# Patient Record
Sex: FEMALE | Race: WHITE | URBAN - METROPOLITAN AREA
[De-identification: names, ages, dates, MRNs, and addresses within clinical notes are randomized per-mention and may not be internally consistent; named-entity substitution may affect disease eponyms.]

---

## 2017-03-28 ENCOUNTER — OUTPATIENT (OUTPATIENT)
Dept: OUTPATIENT SERVICES | Facility: HOSPITAL | Age: 57
LOS: 1 days | Discharge: HOME | End: 2017-03-28

## 2017-03-28 DIAGNOSIS — Z98.89 OTHER SPECIFIED POSTPROCEDURAL STATES: Chronic | ICD-10-CM

## 2017-04-03 DIAGNOSIS — F32.9 MAJOR DEPRESSIVE DISORDER, SINGLE EPISODE, UNSPECIFIED: ICD-10-CM

## 2017-04-03 DIAGNOSIS — H02.532 EYELID RETRACTION RIGHT LOWER EYELID: ICD-10-CM

## 2018-02-12 ENCOUNTER — TRANSCRIPTION ENCOUNTER (OUTPATIENT)
Age: 58
End: 2018-02-12

## 2018-04-10 ENCOUNTER — OUTPATIENT (OUTPATIENT)
Dept: OUTPATIENT SERVICES | Facility: HOSPITAL | Age: 58
LOS: 1 days | Discharge: HOME | End: 2018-04-10

## 2018-04-10 DIAGNOSIS — Z98.89 OTHER SPECIFIED POSTPROCEDURAL STATES: Chronic | ICD-10-CM

## 2019-03-13 ENCOUNTER — TRANSCRIPTION ENCOUNTER (OUTPATIENT)
Age: 59
End: 2019-03-13

## 2019-05-06 ENCOUNTER — TRANSCRIPTION ENCOUNTER (OUTPATIENT)
Age: 59
End: 2019-05-06

## 2019-05-12 ENCOUNTER — TRANSCRIPTION ENCOUNTER (OUTPATIENT)
Age: 59
End: 2019-05-12

## 2019-05-24 ENCOUNTER — APPOINTMENT (OUTPATIENT)
Dept: OBGYN | Facility: CLINIC | Age: 59
End: 2019-05-24
Payer: COMMERCIAL

## 2019-05-24 VITALS
DIASTOLIC BLOOD PRESSURE: 82 MMHG | HEIGHT: 65 IN | BODY MASS INDEX: 24.16 KG/M2 | SYSTOLIC BLOOD PRESSURE: 120 MMHG | WEIGHT: 145 LBS

## 2019-05-24 DIAGNOSIS — Z87.42 PERSONAL HISTORY OF OTHER DISEASES OF THE FEMALE GENITAL TRACT: ICD-10-CM

## 2019-05-24 PROCEDURE — 99386 PREV VISIT NEW AGE 40-64: CPT

## 2019-05-24 NOTE — PHYSICAL EXAM
[Awake] : awake [Alert] : alert [Acute Distress] : no acute distress [Mass] : no breast mass [Soft] : soft [Axillary LAD] : no axillary lymphadenopathy [Nipple Discharge] : no nipple discharge [Oriented x3] : oriented to person, place, and time [Tender] : non tender [Normal] : cervix [Uterine Adnexae] : were not tender and not enlarged [No Bleeding] : there was no active vaginal bleeding

## 2019-05-24 NOTE — DISCUSSION/SUMMARY
[FreeTextEntry1] : a59 years old white female came to the office for annual exam patient has been feeling well physical pelvic exam within normal limits Pap smear done patient will go for mammogram a bone density I spent 25 minutes with the patient

## 2019-05-26 LAB — HPV HIGH+LOW RISK DNA PNL CVX: NOT DETECTED

## 2019-05-31 LAB — CYTOLOGY CVX/VAG DOC THIN PREP: NORMAL

## 2019-06-27 ENCOUNTER — TRANSCRIPTION ENCOUNTER (OUTPATIENT)
Age: 59
End: 2019-06-27

## 2019-10-19 ENCOUNTER — TRANSCRIPTION ENCOUNTER (OUTPATIENT)
Age: 59
End: 2019-10-19

## 2019-12-27 ENCOUNTER — APPOINTMENT (OUTPATIENT)
Dept: INTERNAL MEDICINE | Facility: CLINIC | Age: 59
End: 2019-12-27
Payer: COMMERCIAL

## 2019-12-27 VITALS
WEIGHT: 136 LBS | BODY MASS INDEX: 22.66 KG/M2 | HEIGHT: 65 IN | SYSTOLIC BLOOD PRESSURE: 100 MMHG | DIASTOLIC BLOOD PRESSURE: 75 MMHG

## 2019-12-27 PROCEDURE — 99204 OFFICE O/P NEW MOD 45 MIN: CPT

## 2019-12-27 RX ORDER — SILVER SULFADIAZINE 10 G/1000G
1 CREAM TOPICAL TWICE DAILY
Qty: 1 | Refills: 1 | Status: ACTIVE | COMMUNITY
Start: 2019-12-27 | End: 1900-01-01

## 2019-12-27 NOTE — ASSESSMENT
[FreeTextEntry1] : PT IS A 60YO FEMALE HERE FOR ID EVALUATION PT WITH AREA ON LEFT SHIN WHICH STARTED AROUND JUNE 2018 PT HAS SEEN VARIOUS  VASCULAR SURGEONS WOUND CARE AND NEUROLOGIST ABOUT LEG SWELLING AND SKIN LESION PT Reports SHE HAS A LUMP ON HER LEG LATERAL TO AREA OF SKIN ULCER SHE DENIES SPECIFIC TRAUMA TO THE AREA \par SHE IS CONCERNED ABOUT POTENTIAL INFECTIOUS PROCESS NO FOREIGN TRAVEL NO WHIRLPOOL \par NO ANIMAL EXPOSURE \par  ON EXAM IT APPEARS TO BE A  STASIS  ULCER NO ACTIVE INFECTION NO DRAINAGE\par DEFER ABX NEED TO GET MRI OF TIB /FIB TO R/O UNDERLYING OSTEOMYELITIS \par PATIENT  REPORTS SHE HAS HAD A SKIN BX AND NO MALIGNANCY\par I SUGGEST SILVADENE LEG ELEVATION AND MRI\par WILL FOLLOW UP AFER IMAGING \par KEEP LEG LEVATED\par

## 2019-12-27 NOTE — HISTORY OF PRESENT ILLNESS
[FreeTextEntry1] : PT IS A 58YO FEMALE HERE FOR ID EVALUATION PT WITH AREA ON LEFT SHIN WHICH STARTED AROUND JUNE 2018 PT HAS SEEN VARIOUS  VASCULAR SURGEONS WOUND CARE AND NEUROLOGIST ABOUT LEG SWELLING AND SKIN LESION PT Reports SHE HAS A LUMP ON HER LEG LATERAL TO AREA OF SKIN ULCER SHE DENIES SPECIFIC TRAUMA TO THE AREA \par SHE IS CONCERNED ABOUT POTENTIAL INFECTIOUS PROCESS NO FOREIGN TRAVEL NO WHIRLPOOL \par NO ANIMAL EXPOSURE \par

## 2020-01-03 ENCOUNTER — APPOINTMENT (OUTPATIENT)
Dept: INTERNAL MEDICINE | Facility: CLINIC | Age: 60
End: 2020-01-03
Payer: COMMERCIAL

## 2020-01-03 VITALS
WEIGHT: 133.8 LBS | HEIGHT: 65 IN | SYSTOLIC BLOOD PRESSURE: 135 MMHG | DIASTOLIC BLOOD PRESSURE: 80 MMHG | BODY MASS INDEX: 22.29 KG/M2

## 2020-01-03 PROCEDURE — 99215 OFFICE O/P EST HI 40 MIN: CPT

## 2020-01-04 NOTE — PHYSICAL EXAM
[No Acute Distress] : no acute distress [Well Nourished] : well nourished [Well Developed] : well developed [Normal Sclera/Conjunctiva] : normal sclera/conjunctiva [Well-Appearing] : well-appearing [PERRL] : pupils equal round and reactive to light [EOMI] : extraocular movements intact [Normal Oropharynx] : the oropharynx was normal [Normal Outer Ear/Nose] : the outer ears and nose were normal in appearance [No Lymphadenopathy] : no lymphadenopathy [No JVD] : no jugular venous distention [Supple] : supple [No Respiratory Distress] : no respiratory distress  [Thyroid Normal, No Nodules] : the thyroid was normal and there were no nodules present [No Accessory Muscle Use] : no accessory muscle use [Normal S1, S2] : normal S1 and S2 [Normal Rate] : normal rate  [Regular Rhythm] : with a regular rhythm [Clear to Auscultation] : lungs were clear to auscultation bilaterally [No Abdominal Bruit] : a ~M bruit was not heard ~T in the abdomen [No Carotid Bruits] : no carotid bruits [No Murmur] : no murmur heard [No Varicosities] : no varicosities [No Edema] : there was no peripheral edema [Pedal Pulses Present] : the pedal pulses are present [No Palpable Aorta] : no palpable aorta [Soft] : abdomen soft [No Extremity Clubbing/Cyanosis] : no extremity clubbing/cyanosis [Non-distended] : non-distended [No Masses] : no abdominal mass palpated [Non Tender] : non-tender [Normal Posterior Cervical Nodes] : no posterior cervical lymphadenopathy [No HSM] : no HSM [Normal Bowel Sounds] : normal bowel sounds [Normal Anterior Cervical Nodes] : no anterior cervical lymphadenopathy [No CVA Tenderness] : no CVA  tenderness [Grossly Normal Strength/Tone] : grossly normal strength/tone [No Spinal Tenderness] : no spinal tenderness [No Joint Swelling] : no joint swelling [Coordination Grossly Intact] : coordination grossly intact [Deep Tendon Reflexes (DTR)] : deep tendon reflexes were 2+ and symmetric [Normal Gait] : normal gait [No Focal Deficits] : no focal deficits [de-identified] : LEFT SHIN  WITH ERYTHEMAOTUS AREA NOT WARM STASIS CHANGES [Normal Affect] : the affect was normal [Normal Insight/Judgement] : insight and judgment were intact

## 2020-01-04 NOTE — PLAN
[FreeTextEntry1] : PT IS A 60YO FEMALE HERE FOR ID FOLLOWUP  PT WITH AREA ON LEFT SHIN WHICH STARTED AROUND JUNE 2018 PT HAS SEEN VARIOUS VASCULAR SURGEONS WOUND CARE AND NEUROLOGIST ABOUT LEG SWELLING AND SKIN LESION PT Reports SHE HAS A LUMP ON HER LEG LATERAL TO AREA OF SKIN ULCER SHE DENIES SPECIFIC TRAUMA TO THE AREA \par SHE IS CONCERNED ABOUT POTENTIAL INFECTIOUS PROCESS NO FOREIGN TRAVEL NO WHIRLPOOL \par NO ANIMAL EXPOSURE \par  ON EXAM IT APPEARS TO BE A STASIS ULCER NO ACTIVE INFECTION NO DRAINAGE\par DEFER ABX NEED   MRI OF TIB /FIB T DONE AND NO  OSTEOMYELITIS  ? MYOSITIS NO ABSCESS\par PATIENT REPORTS SHE HAS HAD A SKIN BX AND NO MALIGNANCY\par HAS USED  SILVADENE NO SIGNIFICNAT CHANGE\par WILL RECOMMEND SKIN BX TO RULE OUT AFB MYCOBACTERIAL AND FUNGAL DISEAS BUT THIS IS UNLIKELY\par NEED RHEUM EVAL FOR AUTOIMMUNE EVALUATION\par AND MAY NEED MUSCLE BIOPSY WILL FOLLOWUP\par . \par

## 2020-01-04 NOTE — HISTORY OF PRESENT ILLNESS
[FreeTextEntry1] : ID FOLLOW UP ON LEFT LEG  [de-identified] : PT IS A 60YO FEMALE HERE FOR ID EVALUATION PT WITH AREA ON LEFT SHIN WHICH STARTED AROUND JUNE 2018 PT HAS SEEN VARIOUS VASCULAR SURGEONS WOUND CARE AND NEUROLOGIST ABOUT LEG SWELLING AND SKIN LESION PT Reports SHE HAS A LUMP ON HER LEG LATERAL TO AREA OF SKIN ULCER SHE DENIES SPECIFIC TRAUMA TO THE AREA \par SHE IS CONCERNED ABOUT POTENTIAL INFECTIOUS PROCESS NO FOREIGN TRAVEL NO WHIRLPOOL \par NO ANIMAL EXPOSURE  HERE FOR FOLLOWUP \par PT UNDERWENT MRI OF AREA WHICH SHOWED QUESTION OF MYOSITIS NO UNDERLYING OSTEOMYELITIS AND NO ABSCESS\par NO FEVERS \par \par \par

## 2020-02-12 ENCOUNTER — APPOINTMENT (OUTPATIENT)
Dept: INTERNAL MEDICINE | Facility: CLINIC | Age: 60
End: 2020-02-12
Payer: COMMERCIAL

## 2020-02-12 VITALS
BODY MASS INDEX: 22.16 KG/M2 | SYSTOLIC BLOOD PRESSURE: 110 MMHG | WEIGHT: 133 LBS | DIASTOLIC BLOOD PRESSURE: 80 MMHG | HEIGHT: 65 IN

## 2020-02-12 DIAGNOSIS — L97.909 VARICOSE VEINS OF UNSPECIFIED LOWER EXTREMITY WITH ULCER OF UNSPECIFIED SITE: ICD-10-CM

## 2020-02-12 DIAGNOSIS — I83.009 VARICOSE VEINS OF UNSPECIFIED LOWER EXTREMITY WITH ULCER OF UNSPECIFIED SITE: ICD-10-CM

## 2020-02-12 DIAGNOSIS — G90.529 COMPLEX REGIONAL PAIN SYNDROME I OF UNSPECIFIED LOWER LIMB: ICD-10-CM

## 2020-02-12 DIAGNOSIS — L97.919 NON-PRESSURE CHRONIC ULCER OF UNSPECIFIED PART OF RIGHT LOWER LEG WITH UNSPECIFIED SEVERITY: ICD-10-CM

## 2020-02-12 DIAGNOSIS — I87.2 VENOUS INSUFFICIENCY (CHRONIC) (PERIPHERAL): ICD-10-CM

## 2020-02-12 PROCEDURE — 99215 OFFICE O/P EST HI 40 MIN: CPT

## 2020-02-12 NOTE — ASSESSMENT
[FreeTextEntry1] :  PT IS A 60YO FEMALE HERE FOR ID EVALUATION PT WITH AREA ON LEFT SHIN WHICH STARTED AROUND JUNE 2018 PT HAS SEEN VARIOUS VASCULAR SURGEONS WOUND CARE AND NEUROLOGIST ABOUT LEG SWELLING AND SKIN LESION PT Reports SHE HAS A LUMP ON HER LEG LATERAL TO AREA OF SKIN ULCER SHE DENIES SPECIFIC TRAUMA TO THE AREA \par SHE IS CONCERNED ABOUT POTENTIAL INFECTIOUS PROCESS NO FOREIGN TRAVEL NO WHIRLPOOL \par NO ANIMAL EXPOSURE HERE FOR FOLLOWUP \par PT UNDERWENT MRI OF AREA WHICH SHOWED QUESTION OF MYOSITIS NO UNDERLYING OSTEOMYELITIS AND NO ABSCESS\par NO FEVERS \par \par I BELIEVE PT HAS SOME VENOUS INSUFFIENCY AND HAS STASIS DERMATITIS [facility] ULCERS [facility] HAVE NOT HEALED SHE HAS HAD BX IN THE PAST NO MALIGNANC\par SUGGEST SHE SEE RHEUM IN [facility] TO R/O OUT UNDERLYING AUTOIMMUNE  DISORDER AS HAS HAS  ELVATED NARCISO AND SED RATE\par RAISED POSSIBILITY OF RSD AS POSSBIEL DX BUT NO RECNT TRAUMA ROBERTO CARLOS HAVE DISC SURGERY BUT NO LOWER ET SURGERY\par DEFER ABX AS NO OBVIOUS INFECTION\par PT REQUEST I TAKE OVER AS HER PCP \par

## 2020-02-12 NOTE — HISTORY OF PRESENT ILLNESS
[FreeTextEntry1] :  PT IS A 58YO FEMALE HERE FOR ID EVALUATION PT WITH AREA ON LEFT SHIN WHICH STARTED AROUND JUNE 2018 PT HAS SEEN VARIOUS VASCULAR SURGEONS WOUND CARE AND NEUROLOGIST ABOUT LEG SWELLING AND SKIN LESION PT Reports SHE HAS A LUMP ON HER LEG LATERAL TO AREA OF SKIN ULCER SHE DENIES SPECIFIC TRAUMA TO THE AREA \par SHE IS CONCERNED ABOUT POTENTIAL INFECTIOUS PROCESS NO FOREIGN TRAVEL NO WHIRLPOOL \par NO ANIMAL EXPOSURE HERE FOR FOLLOWUP \par PT UNDERWENT MRI OF AREA WHICH SHOWED QUESTION OF MYOSITIS NO UNDERLYING OSTEOMYELITIS AND NO ABSCESS\par NO FEVERS \par \par

## 2020-03-07 ENCOUNTER — TRANSCRIPTION ENCOUNTER (OUTPATIENT)
Age: 60
End: 2020-03-07

## 2020-03-09 ENCOUNTER — TRANSCRIPTION ENCOUNTER (OUTPATIENT)
Age: 60
End: 2020-03-09

## 2020-05-11 ENCOUNTER — TRANSCRIPTION ENCOUNTER (OUTPATIENT)
Age: 60
End: 2020-05-11

## 2020-07-31 ENCOUNTER — APPOINTMENT (OUTPATIENT)
Dept: DERMATOLOGY | Facility: CLINIC | Age: 60
End: 2020-07-31
Payer: COMMERCIAL

## 2020-07-31 PROCEDURE — 99203 OFFICE O/P NEW LOW 30 MIN: CPT

## 2020-10-05 ENCOUNTER — APPOINTMENT (OUTPATIENT)
Dept: DERMATOLOGY | Facility: CLINIC | Age: 60
End: 2020-10-05

## 2020-11-05 ENCOUNTER — APPOINTMENT (OUTPATIENT)
Dept: DERMATOLOGY | Facility: CLINIC | Age: 60
End: 2020-11-05

## 2021-07-14 ENCOUNTER — TRANSCRIPTION ENCOUNTER (OUTPATIENT)
Age: 61
End: 2021-07-14

## 2022-08-17 ENCOUNTER — APPOINTMENT (OUTPATIENT)
Dept: OPHTHALMOLOGY | Facility: CLINIC | Age: 62
End: 2022-08-17

## 2022-11-07 ENCOUNTER — OFFICE (OUTPATIENT)
Dept: URBAN - METROPOLITAN AREA CLINIC 97 | Facility: CLINIC | Age: 62
Setting detail: OPHTHALMOLOGY
End: 2022-11-07
Payer: COMMERCIAL

## 2022-11-07 DIAGNOSIS — H16.223: ICD-10-CM

## 2022-11-07 DIAGNOSIS — H25.11: ICD-10-CM

## 2022-11-07 DIAGNOSIS — H26.491: ICD-10-CM

## 2022-11-07 PROCEDURE — 99024 POSTOP FOLLOW-UP VISIT: CPT | Performed by: OPHTHALMOLOGY

## 2022-11-07 ASSESSMENT — KERATOMETRY
OS_K2POWER_DIOPTERS: 43.50
METHOD_AUTO_MANUAL: AUTO
OD_K2POWER_DIOPTERS: 43.00
OD_K1POWER_DIOPTERS: 42.75
OS_AXISANGLE_DEGREES: 166
OD_AXISANGLE_DEGREES: 153
OS_K1POWER_DIOPTERS: 43.00

## 2022-11-07 ASSESSMENT — REFRACTION_AUTOREFRACTION
OS_SPHERE: +2.50
OS_AXIS: 173
OD_CYLINDER: +0.75
OD_SPHERE: +0.25
OD_AXIS: 06
OS_CYLINDER: +1.00

## 2022-11-07 ASSESSMENT — REFRACTION_MANIFEST
OD_VA2: 20/20(J1+)
OD_VA1: 20/30
OD_VA1: 20/30
OD_ADD: +2.75
OS_SPHERE: +2.50
OD_SPHERE: +0.50
OS_VA1: 20/25-1
OS_VA2: 20/20(J1+)
OD_SPHERE: +3.00
OS_VA2: 20/20(J1+)
OS_AXIS: 170
OS_CYLINDER: +0.75
OS_SPHERE: +2.00
OD_CYLINDER: +1.00
OD_AXIS: 05
OD_VA2: 20/20(J1+)
OS_VA1: 20/20
OD_ADD: +2.50
OU_VA: 20/25-2
OS_ADD: +2.50
OD_CYLINDER: +0.75
OS_AXIS: 170
OS_CYLINDER: +0.50
OU_VA: 20/20
OD_AXIS: 165
OS_ADD: +2.75

## 2022-11-07 ASSESSMENT — REFRACTION_CURRENTRX
OD_AXIS: 28
OS_ADD: +2.25
OS_CYLINDER: +0.50
OD_ADD: +2.25
OS_AXIS: 167
OS_SPHERE: +2.50
OS_OVR_VA: 20/
OS_VPRISM_DIRECTION: PROGS
OD_OVR_VA: 20/
OD_VPRISM_DIRECTION: PROGS
OD_SPHERE: +2.75
OD_CYLINDER: +0.50

## 2022-11-07 ASSESSMENT — AXIALLENGTH_DERIVED
OD_AL: 23.4811
OD_AL: 23.578
OS_AL: 22.6514
OS_AL: 22.7869
OD_AL: 22.5099
OS_AL: 22.5619

## 2022-11-07 ASSESSMENT — CONFRONTATIONAL VISUAL FIELD TEST (CVF)
OS_FINDINGS: FULL
OD_FINDINGS: FULL

## 2022-11-07 ASSESSMENT — VISUAL ACUITY
OD_BCVA: 20/20-2
OS_BCVA: 20/60-1

## 2022-11-07 ASSESSMENT — SPHEQUIV_DERIVED
OD_SPHEQUIV: 0.625
OD_SPHEQUIV: 3.5
OS_SPHEQUIV: 3
OS_SPHEQUIV: 2.75
OS_SPHEQUIV: 2.375
OD_SPHEQUIV: 0.875

## 2022-11-07 ASSESSMENT — TONOMETRY
OD_IOP_MMHG: 10
OS_IOP_MMHG: 12

## 2022-11-09 ENCOUNTER — APPOINTMENT (OUTPATIENT)
Dept: COLORECTAL SURGERY | Facility: CLINIC | Age: 62
End: 2022-11-09

## 2022-11-09 VITALS
TEMPERATURE: 98.2 F | WEIGHT: 140 LBS | BODY MASS INDEX: 23.32 KG/M2 | SYSTOLIC BLOOD PRESSURE: 125 MMHG | HEART RATE: 73 BPM | DIASTOLIC BLOOD PRESSURE: 93 MMHG | HEIGHT: 65 IN | OXYGEN SATURATION: 97 % | RESPIRATION RATE: 15 BRPM

## 2022-11-09 DIAGNOSIS — K62.3 RECTAL PROLAPSE: ICD-10-CM

## 2022-11-09 DIAGNOSIS — N81.6 RECTOCELE: ICD-10-CM

## 2022-11-09 PROCEDURE — 99204 OFFICE O/P NEW MOD 45 MIN: CPT | Mod: 25

## 2022-11-09 PROCEDURE — 46600 DIAGNOSTIC ANOSCOPY SPX: CPT

## 2022-11-09 RX ORDER — BUPROPION HYDROCHLORIDE 300 MG/1
300 TABLET, EXTENDED RELEASE ORAL
Qty: 90 | Refills: 0 | Status: ACTIVE | COMMUNITY
Start: 2022-09-16

## 2022-11-09 RX ORDER — ZOLPIDEM TARTRATE 5 MG/1
5 TABLET ORAL
Qty: 30 | Refills: 0 | Status: ACTIVE | COMMUNITY
Start: 2022-11-02

## 2022-11-09 RX ORDER — DENOSUMAB 60 MG/ML
60 INJECTION SUBCUTANEOUS
Refills: 0 | Status: ACTIVE | COMMUNITY

## 2022-11-09 RX ORDER — PREDNISOLONE ACETATE 10 MG/ML
1 SUSPENSION/ DROPS OPHTHALMIC
Qty: 5 | Refills: 0 | Status: DISCONTINUED | COMMUNITY
Start: 2022-09-16

## 2022-11-09 RX ORDER — OFLOXACIN 3 MG/ML
0.3 SOLUTION/ DROPS OPHTHALMIC
Qty: 5 | Refills: 0 | Status: DISCONTINUED | COMMUNITY
Start: 2022-09-16

## 2022-11-09 RX ORDER — HYDROXYCHLOROQUINE SULFATE 200 MG/1
200 TABLET, FILM COATED ORAL
Refills: 0 | Status: ACTIVE | COMMUNITY

## 2022-11-09 NOTE — ASSESSMENT
[FreeTextEntry1] : We discussed the treatment options for her rectocele including stool bulking and surgery.  It is unclear if she is also having uterovaginal prolapse.  Will refer to urogynecology to be evaluated for that.  If they find there is something correctable on their end then she would have the rectocele fixed with them at the same time\par \par She will try switching to a different fiber supplement and will increase her water intake to try to better bulk her stool

## 2022-11-09 NOTE — HISTORY OF PRESENT ILLNESS
[FreeTextEntry1] : Patient is a 63 yo female here with complaints of a rectal bulge to the vagina and difficulty evacuating stool.  She was referred by her GYN.  She states that she has had constipation for many years and she started taking fiber gummies about a year ago which helped to some degree.  However she continues to have occasions of small pellet-like stools which are much more difficult to evacuate.  She also feels a very large bulge in her vagina with straining that is in addition to the bulge from her rectum into her vagina.\par She denies any urinary symptoms.  She does not currently splint\par \par She has a history of 2 vaginal deliveries and one .\par \par Colonoscopy last year

## 2022-11-09 NOTE — PHYSICAL EXAM
[Normal Breath Sounds] : Normal breath sounds [Normal Heart Sounds] : normal heart sounds [Normal Rate and Rhythm] : normal rate and rhythm [No Rash or Lesion] : No rash or lesion [Alert] : alert [Oriented to Person] : oriented to person [Oriented to Place] : oriented to place [Oriented to Time] : oriented to time [Calm] : calm [Excoriation] : no perianal excoriation [Fistula] : no fistulas [Wart] : no warts [Ulcer ___ cm] : no ulcers [Normal] : was normal [None] : there was no rectal mass  [de-identified] : round, NT/ND, +BS [de-identified] : No significant external hemorrhoids, visible rectocele in the posterior vagina [de-identified] : Large palpable rectocele, anoscopy reveals mildly enlarged internal hemorrhoids [de-identified] : Normal female [de-identified] : NC/AT [de-identified] : JESUS/+ROM [de-identified] : Intact

## 2022-11-09 NOTE — CONSULT LETTER
[Dear  ___] : Dear  [unfilled], [Consult Letter:] : I had the pleasure of evaluating your patient, [unfilled]. [Please see my note below.] : Please see my note below. [Consult Closing:] : Thank you very much for allowing me to participate in the care of this patient.  If you have any questions, please do not hesitate to contact me. [Sincerely,] : Sincerely, [FreeTextEntry3] : Wiliam Anders MD, FACS, FASCRS\par Colorectal Surgery\par [facility]\par Assistant Professor of Surgery [employer] at [facility]\par [address] [place] [address], [address]\[place] [address]\par Tel: [phone] \par Cell: [phone] \par Email: [email or web address]\par

## 2022-11-09 NOTE — REVIEW OF SYSTEMS
[As Noted in HPI] : as noted in HPI [Anxiety] : anxiety [Negative] : Heme/Lymph [FreeTextEntry3] : Wears glasses [FreeTextEntry8] : urinary retention [FreeTextEntry9] : joint, neck and back pain [de-identified] : numbness

## 2022-11-17 ENCOUNTER — OFFICE (OUTPATIENT)
Dept: URBAN - METROPOLITAN AREA CLINIC 8 | Facility: CLINIC | Age: 62
Setting detail: OPHTHALMOLOGY
End: 2022-11-17
Payer: COMMERCIAL

## 2022-11-17 DIAGNOSIS — H26.491: ICD-10-CM

## 2022-11-17 DIAGNOSIS — H02.204: ICD-10-CM

## 2022-11-17 DIAGNOSIS — H02.205: ICD-10-CM

## 2022-11-17 DIAGNOSIS — Z79.899: ICD-10-CM

## 2022-11-17 DIAGNOSIS — H02.201: ICD-10-CM

## 2022-11-17 DIAGNOSIS — H16.223: ICD-10-CM

## 2022-11-17 DIAGNOSIS — H25.11: ICD-10-CM

## 2022-11-17 DIAGNOSIS — H02.202: ICD-10-CM

## 2022-11-17 DIAGNOSIS — H11.153: ICD-10-CM

## 2022-11-17 PROCEDURE — 99213 OFFICE O/P EST LOW 20 MIN: CPT | Performed by: OPHTHALMOLOGY

## 2022-11-17 ASSESSMENT — SPHEQUIV_DERIVED
OS_SPHEQUIV: 2.375
OS_SPHEQUIV: 3
OD_SPHEQUIV: 3.5
OS_SPHEQUIV: 2.75
OD_SPHEQUIV: 0.625
OD_SPHEQUIV: 0.875

## 2022-11-17 ASSESSMENT — TEAR BREAK UP TIME (TBUT)
OD_TBUT: 4 SEC
OS_TBUT: 4 SEC

## 2022-11-17 ASSESSMENT — KERATOMETRY
OS_K2POWER_DIOPTERS: 43.50
OD_K1POWER_DIOPTERS: 42.75
OD_AXISANGLE_DEGREES: 153
OD_K2POWER_DIOPTERS: 43.00
OS_K1POWER_DIOPTERS: 43.00
METHOD_AUTO_MANUAL: AUTO
OS_AXISANGLE_DEGREES: 166

## 2022-11-17 ASSESSMENT — REFRACTION_MANIFEST
OD_AXIS: 165
OD_ADD: +2.50
OD_VA2: 20/20(J1+)
OD_ADD: +2.75
OD_VA1: 20/30
OD_SPHERE: +0.50
OS_VA2: 20/20(J1+)
OU_VA: 20/25-2
OD_VA1: 20/30
OS_SPHERE: +2.00
OS_AXIS: 170
OU_VA: 20/20
OS_VA2: 20/20(J1+)
OS_ADD: +2.50
OS_CYLINDER: +0.75
OD_CYLINDER: +1.00
OS_SPHERE: +2.50
OS_VA1: 20/25-1
OS_AXIS: 170
OD_SPHERE: +3.00
OD_VA2: 20/20(J1+)
OS_CYLINDER: +0.50
OS_VA1: 20/20
OD_CYLINDER: +0.75
OS_ADD: +2.75
OD_AXIS: 05

## 2022-11-17 ASSESSMENT — LID EXAM ASSESSMENTS
OD_COMMENTS: MEDIAL RETRACTION LOWER LID POSITIO
OD_EDEMA: RLL T

## 2022-11-17 ASSESSMENT — REFRACTION_CURRENTRX
OD_SPHERE: +2.75
OD_AXIS: 28
OS_AXIS: 167
OD_CYLINDER: +0.50
OS_VPRISM_DIRECTION: PROGS
OD_VPRISM_DIRECTION: PROGS
OS_SPHERE: +2.50
OS_ADD: +2.25
OS_CYLINDER: +0.50
OD_ADD: +2.25
OS_OVR_VA: 20/
OD_OVR_VA: 20/

## 2022-11-17 ASSESSMENT — REFRACTION_AUTOREFRACTION
OD_SPHERE: +0.25
OD_AXIS: 06
OD_CYLINDER: +0.75
OS_SPHERE: +2.50
OS_AXIS: 173
OS_CYLINDER: +1.00

## 2022-11-17 ASSESSMENT — AXIALLENGTH_DERIVED
OS_AL: 22.5619
OD_AL: 23.578
OS_AL: 22.6514
OD_AL: 22.5099
OS_AL: 22.7869
OD_AL: 23.4811

## 2022-11-17 ASSESSMENT — CONFRONTATIONAL VISUAL FIELD TEST (CVF)
OS_FINDINGS: FULL
OD_FINDINGS: FULL

## 2022-11-17 ASSESSMENT — VISUAL ACUITY
OD_BCVA: 20/40
OS_BCVA: 20/30+2

## 2022-12-15 ENCOUNTER — OFFICE (OUTPATIENT)
Dept: URBAN - METROPOLITAN AREA CLINIC 8 | Facility: CLINIC | Age: 62
Setting detail: OPHTHALMOLOGY
End: 2022-12-15
Payer: COMMERCIAL

## 2022-12-15 DIAGNOSIS — H02.201: ICD-10-CM

## 2022-12-15 DIAGNOSIS — H02.204: ICD-10-CM

## 2022-12-15 DIAGNOSIS — H02.205: ICD-10-CM

## 2022-12-15 DIAGNOSIS — Z79.899: ICD-10-CM

## 2022-12-15 DIAGNOSIS — H26.491: ICD-10-CM

## 2022-12-15 DIAGNOSIS — H02.202: ICD-10-CM

## 2022-12-15 PROCEDURE — 99024 POSTOP FOLLOW-UP VISIT: CPT | Performed by: OPHTHALMOLOGY

## 2022-12-15 PROCEDURE — 66821 AFTER CATARACT LASER SURGERY: CPT | Performed by: OPHTHALMOLOGY

## 2022-12-15 ASSESSMENT — TEAR BREAK UP TIME (TBUT)
OD_TBUT: 4 SEC
OS_TBUT: 4 SEC

## 2022-12-15 ASSESSMENT — CONFRONTATIONAL VISUAL FIELD TEST (CVF)
OS_FINDINGS: FULL
OD_FINDINGS: FULL

## 2022-12-15 ASSESSMENT — LID EXAM ASSESSMENTS
OD_COMMENTS: MEDIAL RETRACTION LOWER LID POSITIO
OD_EDEMA: RLL T

## 2022-12-16 ENCOUNTER — OFFICE (OUTPATIENT)
Dept: URBAN - METROPOLITAN AREA CLINIC 9 | Facility: CLINIC | Age: 62
Setting detail: OPHTHALMOLOGY
End: 2022-12-16
Payer: COMMERCIAL

## 2022-12-16 DIAGNOSIS — Z79.899: ICD-10-CM

## 2022-12-16 DIAGNOSIS — H26.491: ICD-10-CM

## 2022-12-16 DIAGNOSIS — H20.00: ICD-10-CM

## 2022-12-16 PROBLEM — H02.205 UNSPECIFIED LAGOPHTHALMUS; RIGHT UPPER LID, RIGHT LOWER LID, LEFT UPPER LID, LEFT LOWER LID: Status: RESOLVED | Noted: 2022-11-17 | Resolved: 2022-12-16

## 2022-12-16 PROBLEM — H11.153 PINGUECULA; BOTH EYES: Status: ACTIVE | Noted: 2022-11-17

## 2022-12-16 PROBLEM — H02.204 UNSPECIFIED LAGOPHTHALMUS; RIGHT UPPER LID, RIGHT LOWER LID, LEFT UPPER LID, LEFT LOWER LID: Status: RESOLVED | Noted: 2022-11-17 | Resolved: 2022-12-16

## 2022-12-16 PROBLEM — H02.201 UNSPECIFIED LAGOPHTHALMUS; RIGHT UPPER LID, RIGHT LOWER LID, LEFT UPPER LID, LEFT LOWER LID: Status: RESOLVED | Noted: 2022-11-17 | Resolved: 2022-12-16

## 2022-12-16 PROBLEM — H02.202 UNSPECIFIED LAGOPHTHALMUS; RIGHT UPPER LID, RIGHT LOWER LID, LEFT UPPER LID, LEFT LOWER LID: Status: RESOLVED | Noted: 2022-11-17 | Resolved: 2022-12-16

## 2022-12-16 PROCEDURE — 92083 EXTENDED VISUAL FIELD XM: CPT | Performed by: OPHTHALMOLOGY

## 2022-12-16 PROCEDURE — 99024 POSTOP FOLLOW-UP VISIT: CPT | Performed by: OPHTHALMOLOGY

## 2022-12-16 ASSESSMENT — TEAR BREAK UP TIME (TBUT)
OD_TBUT: 4 SEC
OS_TBUT: 4 SEC

## 2022-12-16 ASSESSMENT — CONFRONTATIONAL VISUAL FIELD TEST (CVF)
OD_FINDINGS: FULL
OS_FINDINGS: FULL

## 2022-12-16 ASSESSMENT — LID EXAM ASSESSMENTS
OD_COMMENTS: MEDIAL RETRACTION LOWER LID POSITIO
OD_EDEMA: RLL T

## 2022-12-20 ENCOUNTER — OFFICE (OUTPATIENT)
Dept: URBAN - METROPOLITAN AREA CLINIC 8 | Facility: CLINIC | Age: 62
Setting detail: OPHTHALMOLOGY
End: 2022-12-20
Payer: COMMERCIAL

## 2022-12-20 DIAGNOSIS — H25.12: ICD-10-CM

## 2022-12-20 DIAGNOSIS — H26.491: ICD-10-CM

## 2022-12-20 DIAGNOSIS — H20.00: ICD-10-CM

## 2022-12-20 DIAGNOSIS — H43.391: ICD-10-CM

## 2022-12-20 PROBLEM — Z96.1 PSEUDOPHAKIA ; RIGHT EYE: Status: ACTIVE | Noted: 2022-12-20

## 2022-12-20 PROCEDURE — 99024 POSTOP FOLLOW-UP VISIT: CPT | Performed by: OPHTHALMOLOGY

## 2022-12-20 ASSESSMENT — REFRACTION_MANIFEST
OS_AXIS: 080
OD_VA2: 20/20(J1+)
OS_ADD: +2.75
OD_SPHERE: +1.25
OS_SPHERE: +2.75
OD_ADD: +2.75
OD_SPHERE: +0.25
OD_VA1: 20/25+
OS_ADD: +2.25
OS_SPHERE: +2.50
OU_VA: 20/25-2
OS_CYLINDER: -0.50
OD_VA2: 20/20(J1+)
OD_CYLINDER: -0.75
OD_AXIS: 095
OD_CYLINDER: SPHERE
OS_VA2: 20/20(J1+)
OS_AXIS: 080
OD_ADD: +2.25
OS_CYLINDER: -0.75
OS_VA1: 20/25-1
OS_VA2: 20/20(J1+)
OU_VA: 20/25-2
OD_VA1: 20/30
OS_VA1: 20/25-1

## 2022-12-20 ASSESSMENT — KERATOMETRY
OS_AXISANGLE_DEGREES: 166
OS_K1POWER_DIOPTERS: 43.00
OD_K2POWER_DIOPTERS: 43.00
OS_K2POWER_DIOPTERS: 43.50
OD_AXISANGLE_DEGREES: 153
OD_K1POWER_DIOPTERS: 42.75
METHOD_AUTO_MANUAL: AUTO

## 2022-12-20 ASSESSMENT — REFRACTION_CURRENTRX
OS_VPRISM_DIRECTION: PROGS
OS_SPHERE: +2.50
OS_AXIS: 167
OD_ADD: +2.25
OS_OVR_VA: 20/
OD_CYLINDER: +0.50
OD_AXIS: 028
OS_CYLINDER: +0.50
OD_OVR_VA: 20/
OD_SPHERE: +2.75
OD_VPRISM_DIRECTION: PROGS
OS_ADD: +2.25

## 2022-12-20 ASSESSMENT — TEAR BREAK UP TIME (TBUT)
OS_TBUT: 4 SEC
OD_TBUT: 4 SEC

## 2022-12-20 ASSESSMENT — SPHEQUIV_DERIVED
OS_SPHEQUIV: 2.25
OS_SPHEQUIV: 2.375
OS_SPHEQUIV: 3
OD_SPHEQUIV: 0.875
OD_SPHEQUIV: 0.625

## 2022-12-20 ASSESSMENT — AXIALLENGTH_DERIVED
OS_AL: 22.7869
OD_AL: 23.4811
OS_AL: 22.5619
OS_AL: 22.8324
OD_AL: 23.578

## 2022-12-20 ASSESSMENT — REFRACTION_AUTOREFRACTION
OS_SPHERE: +2.50
OS_AXIS: 173
OD_CYLINDER: +0.75
OD_AXIS: 06
OD_SPHERE: +0.25
OS_CYLINDER: +1.00

## 2022-12-20 ASSESSMENT — CONFRONTATIONAL VISUAL FIELD TEST (CVF)
OS_FINDINGS: FULL
OD_FINDINGS: FULL

## 2022-12-20 ASSESSMENT — VISUAL ACUITY
OS_BCVA: 20/25
OD_BCVA: 20/30

## 2022-12-21 ENCOUNTER — RX ONLY (RX ONLY)
Age: 62
End: 2022-12-21

## 2022-12-21 PROBLEM — H16.223 DRY EYE SYNDROME K SICCA; BOTH EYES: Status: ACTIVE | Noted: 2022-12-20

## 2022-12-21 PROBLEM — H20.00 IRITIS ACUTE: Status: ACTIVE | Noted: 2022-12-16

## 2022-12-21 PROBLEM — H11.153 PINGUECULA; BOTH EYES: Status: ACTIVE | Noted: 2022-12-20

## 2022-12-21 ASSESSMENT — KERATOMETRY
OD_AXISANGLE_DEGREES: 153
OS_AXISANGLE_DEGREES: 166
OD_K2POWER_DIOPTERS: 43.00
OD_K1POWER_DIOPTERS: 42.75
OS_K1POWER_DIOPTERS: 43.00
OD_AXISANGLE_DEGREES: 153
OD_K2POWER_DIOPTERS: 43.00
METHOD_AUTO_MANUAL: AUTO
OS_K1POWER_DIOPTERS: 43.00
OS_K2POWER_DIOPTERS: 43.50
OS_K2POWER_DIOPTERS: 43.50
OD_K1POWER_DIOPTERS: 42.75
OS_AXISANGLE_DEGREES: 166
METHOD_AUTO_MANUAL: AUTO

## 2022-12-21 ASSESSMENT — REFRACTION_MANIFEST
OU_VA: 20/20
OS_ADD: +2.50
OD_VA2: 20/20(J1+)
OS_VA1: 20/25-1
OS_SPHERE: +2.00
OD_ADD: +2.75
OS_VA1: 20/20
OD_SPHERE: +0.50
OD_CYLINDER: +1.00
OS_ADD: +2.75
OS_VA1: 20/20
OS_AXIS: 170
OD_SPHERE: +3.00
OD_VA1: 20/30
OD_VA1: 20/30
OS_AXIS: 170
OD_CYLINDER: +0.75
OD_VA2: 20/20(J1+)
OS_CYLINDER: +0.75
OU_VA: 20/25-2
OD_SPHERE: +3.00
OS_VA2: 20/20(J1+)
OS_VA1: 20/25-1
OD_CYLINDER: +0.75
OS_SPHERE: +2.00
OS_CYLINDER: +0.75
OD_AXIS: 05
OS_AXIS: 170
OD_AXIS: 05
OD_AXIS: 165
OD_VA1: 20/30
OD_AXIS: 165
OS_VA2: 20/20(J1+)
OD_ADD: +2.50
OS_AXIS: 170
OD_VA2: 20/20(J1+)
OD_VA1: 20/30
OD_CYLINDER: +1.00
OU_VA: 20/20
OD_VA2: 20/20(J1+)
OS_CYLINDER: +0.50
OD_ADD: +2.50
OD_ADD: +2.75
OD_SPHERE: +0.50
OS_VA2: 20/20(J1+)
OU_VA: 20/25-2
OS_VA2: 20/20(J1+)
OS_SPHERE: +2.50
OS_CYLINDER: +0.50
OS_ADD: +2.75
OS_SPHERE: +2.50
OS_ADD: +2.50

## 2022-12-21 ASSESSMENT — AXIALLENGTH_DERIVED
OD_AL: 23.4811
OS_AL: 22.5619
OS_AL: 22.7869
OD_AL: 22.5099
OS_AL: 22.7869
OD_AL: 23.4811
OS_AL: 22.6514
OS_AL: 22.6514
OD_AL: 23.578
OD_AL: 23.578
OS_AL: 22.5619
OD_AL: 22.5099

## 2022-12-21 ASSESSMENT — REFRACTION_AUTOREFRACTION
OS_AXIS: 173
OD_SPHERE: +0.25
OD_CYLINDER: +0.75
OS_SPHERE: +2.50
OS_CYLINDER: +1.00
OD_SPHERE: +0.25
OS_AXIS: 173
OD_CYLINDER: +0.75
OD_AXIS: 06
OD_AXIS: 06
OS_SPHERE: +2.50
OS_CYLINDER: +1.00

## 2022-12-21 ASSESSMENT — SPHEQUIV_DERIVED
OS_SPHEQUIV: 2.375
OD_SPHEQUIV: 0.625
OS_SPHEQUIV: 2.375
OD_SPHEQUIV: 0.625
OD_SPHEQUIV: 3.5
OS_SPHEQUIV: 3
OD_SPHEQUIV: 0.875
OS_SPHEQUIV: 2.75
OS_SPHEQUIV: 2.75
OS_SPHEQUIV: 3
OD_SPHEQUIV: 3.5
OD_SPHEQUIV: 0.875

## 2022-12-21 ASSESSMENT — REFRACTION_CURRENTRX
OS_SPHERE: +2.50
OS_VPRISM_DIRECTION: PROGS
OD_OVR_VA: 20/
OS_VPRISM_DIRECTION: PROGS
OD_CYLINDER: +0.50
OD_ADD: +2.25
OD_AXIS: 028
OS_OVR_VA: 20/
OD_ADD: +2.25
OD_SPHERE: +2.75
OS_SPHERE: +2.50
OD_CYLINDER: +0.50
OS_OVR_VA: 20/
OS_ADD: +2.25
OS_CYLINDER: +0.50
OD_VPRISM_DIRECTION: PROGS
OD_SPHERE: +2.75
OD_AXIS: 28
OS_AXIS: 167
OD_OVR_VA: 20/
OS_AXIS: 167
OS_CYLINDER: +0.50
OS_ADD: +2.25
OD_VPRISM_DIRECTION: PROGS

## 2022-12-21 ASSESSMENT — VISUAL ACUITY
OD_BCVA: 20/150
OS_BCVA: 20/30-1
OS_BCVA: 20/30
OD_BCVA: 20/100-1

## 2023-01-31 ENCOUNTER — APPOINTMENT (OUTPATIENT)
Dept: UROGYNECOLOGY | Facility: CLINIC | Age: 63
End: 2023-01-31

## 2023-04-05 ENCOUNTER — OFFICE (OUTPATIENT)
Dept: URBAN - METROPOLITAN AREA CLINIC 8 | Facility: CLINIC | Age: 63
Setting detail: OPHTHALMOLOGY
End: 2023-04-05
Payer: COMMERCIAL

## 2023-04-05 DIAGNOSIS — H43.811: ICD-10-CM

## 2023-04-05 DIAGNOSIS — Z79.899: ICD-10-CM

## 2023-04-05 DIAGNOSIS — H35.373: ICD-10-CM

## 2023-04-05 DIAGNOSIS — Z96.1: ICD-10-CM

## 2023-04-05 DIAGNOSIS — H25.12: ICD-10-CM

## 2023-04-05 DIAGNOSIS — H16.223: ICD-10-CM

## 2023-04-05 DIAGNOSIS — H11.153: ICD-10-CM

## 2023-04-05 PROCEDURE — 99214 OFFICE O/P EST MOD 30 MIN: CPT | Performed by: OPHTHALMOLOGY

## 2023-04-05 PROCEDURE — 92134 CPTRZ OPH DX IMG PST SGM RTA: CPT | Performed by: OPHTHALMOLOGY

## 2023-04-05 ASSESSMENT — SPHEQUIV_DERIVED
OD_SPHEQUIV: 1.125
OS_SPHEQUIV: 2.375
OS_SPHEQUIV: 3.375
OD_SPHEQUIV: 0.875
OD_SPHEQUIV: 0.75
OS_SPHEQUIV: 3

## 2023-04-05 ASSESSMENT — REFRACTION_MANIFEST
OU_VA: 20/25-2
OD_SPHERE: +1.00
OS_ADD: +2.25
OD_AXIS: 090
OD_ADD: +2.75
OS_SPHERE: +3.25
OS_VA1: 20/25-1
OD_VA2: 20/20(J1+)
OD_VA1: 20/30
OS_ADD: +2.75
OD_CYLINDER: -0.75
OD_SPHERE: +1.25
OS_CYLINDER: -0.75
OS_AXIS: 090
OD_AXIS: 095
OS_VA2: 20/20(J1+)
OD_ADD: +2.25
OD_VA1: 20/30
OS_AXIS: 080
OD_VA2: 20/20(J1+)
OS_CYLINDER: -0.50
OS_SPHERE: +2.75
OS_VA2: 20/20(J1+)
OD_CYLINDER: -0.50

## 2023-04-05 ASSESSMENT — TONOMETRY
OS_IOP_MMHG: 11
OD_IOP_MMHG: 11

## 2023-04-05 ASSESSMENT — AXIALLENGTH_DERIVED
OS_AL: 22.4707
OD_AL: 23.34
OD_AL: 23.4357
OS_AL: 22.8298
OD_AL: 23.4839
OS_AL: 22.604

## 2023-04-05 ASSESSMENT — REFRACTION_CURRENTRX
OS_CYLINDER: +0.50
OS_ADD: +2.50
OS_VPRISM_DIRECTION: PROGS
OS_AXIS: 097
OD_CYLINDER: +0.50
OD_SPHERE: +0.75
OS_CYLINDER: -0.50
OS_ADD: +2.25
OD_VPRISM_DIRECTION: PROGS
OD_AXIS: 089
OD_OVR_VA: 20/
OS_VPRISM_DIRECTION: PROGS
OD_ADD: +2.25
OS_OVR_VA: 20/
OD_AXIS: 028
OS_SPHERE: +2.75
OD_CYLINDER: -0.50
OS_AXIS: 167
OD_SPHERE: +2.75
OS_OVR_VA: 20/
OD_ADD: +2.50
OD_VPRISM_DIRECTION: PROGS
OS_SPHERE: +2.50
OD_OVR_VA: 20/

## 2023-04-05 ASSESSMENT — REFRACTION_AUTOREFRACTION
OD_CYLINDER: -1.25
OS_AXIS: 087
OD_SPHERE: +1.75
OS_CYLINDER: -1.25
OD_AXIS: 087
OS_SPHERE: +4.00

## 2023-04-05 ASSESSMENT — CONFRONTATIONAL VISUAL FIELD TEST (CVF)
OD_FINDINGS: FULL
OS_FINDINGS: FULL

## 2023-04-05 ASSESSMENT — TEAR BREAK UP TIME (TBUT)
OS_TBUT: 8 SEC
OD_TBUT: 8 SEC

## 2023-04-05 ASSESSMENT — VISUAL ACUITY
OD_BCVA: 20/30-1
OS_BCVA: 20/25-2

## 2023-04-05 ASSESSMENT — KERATOMETRY
OD_K2POWER_DIOPTERS: 43.00
OS_K2POWER_DIOPTERS: 43.50
OS_AXISANGLE_DEGREES: 166
OD_K1POWER_DIOPTERS: 43.00
OD_AXISANGLE_DEGREES: 090
OS_K1POWER_DIOPTERS: 42.75
METHOD_AUTO_MANUAL: AUTO

## 2023-04-05 ASSESSMENT — SUPERFICIAL PUNCTATE KERATITIS (SPK)
OS_SPK: T
OD_SPK: T

## 2023-09-24 ENCOUNTER — NON-APPOINTMENT (OUTPATIENT)
Age: 63
End: 2023-09-24

## 2023-09-25 ENCOUNTER — NON-APPOINTMENT (OUTPATIENT)
Age: 63
End: 2023-09-25

## 2023-09-25 ENCOUNTER — APPOINTMENT (OUTPATIENT)
Dept: OPHTHALMOLOGY | Facility: CLINIC | Age: 63
End: 2023-09-25
Payer: COMMERCIAL

## 2023-09-25 PROCEDURE — 99204 OFFICE O/P NEW MOD 45 MIN: CPT

## 2023-11-20 ENCOUNTER — APPOINTMENT (OUTPATIENT)
Dept: OPHTHALMOLOGY | Facility: CLINIC | Age: 63
End: 2023-11-20

## 2023-11-29 ENCOUNTER — APPOINTMENT (OUTPATIENT)
Dept: OPHTHALMOLOGY | Facility: CLINIC | Age: 63
End: 2023-11-29

## 2024-01-18 ENCOUNTER — APPOINTMENT (OUTPATIENT)
Dept: OPHTHALMOLOGY | Facility: CLINIC | Age: 64
End: 2024-01-18

## 2024-02-01 ENCOUNTER — APPOINTMENT (OUTPATIENT)
Dept: OPHTHALMOLOGY | Facility: CLINIC | Age: 64
End: 2024-02-01

## 2024-02-22 ENCOUNTER — APPOINTMENT (OUTPATIENT)
Dept: OPHTHALMOLOGY | Facility: CLINIC | Age: 64
End: 2024-02-22